# Patient Record
Sex: MALE | Race: OTHER
[De-identification: names, ages, dates, MRNs, and addresses within clinical notes are randomized per-mention and may not be internally consistent; named-entity substitution may affect disease eponyms.]

---

## 2018-11-13 ENCOUNTER — HOSPITAL ENCOUNTER (OUTPATIENT)
Dept: HOSPITAL 61 - ECHO | Age: 53
Discharge: HOME | End: 2018-11-13
Attending: INTERNAL MEDICINE
Payer: COMMERCIAL

## 2018-11-13 DIAGNOSIS — I08.2: Primary | ICD-10-CM

## 2018-11-13 PROCEDURE — 93306 TTE W/DOPPLER COMPLETE: CPT

## 2018-11-13 NOTE — CARD
MR#: [id number]

Account#: [id number]

Accession#: [id number]

Date of Study: 11/13/2018

Ordering Physician: MAGGIE MARSHALL, 

Referring Physician: MAGGIE MARSHALL, 

Tech: Shelia Bermudez [facility]





--------------- APPROVED REPORT --------------





EXAM: Two-dimensional and M-mode echocardiogram with Doppler and color Doppler.



Other Information 

Quality : GoodHR: 68bpm

Rhythm : NSR



INDICATION

Dyspnea 



2D DIMENSIONS 

RVDd2.9 (2.9-3.5cm)IVSd1.3 (0.7-1.1cm)

Aortic Root(2D)2.8 (2.0-3.7cm)LVDd4.7 (3.9-5.9cm)

PWd1.2 (0.7-1.1cm)LVDs3.1 (2.5-4.0cm)

FS (%) 35.3 %SV66.9 ml

LVEF(%)64.6 (>50%)



M-Mode DIMENSIONS 

Left Atrium(MM)3.35 (2.5-4.0cm)Aortic Root3.90 (2.2-3.7cm)



Aortic Valve

AoV Peak Riley.181.0cm/sAoV VTI34.7cm

AO Peak GR.13.1mmHgLVOT Peak Riley.132.7cm/s

AO Mean GR.7mmHgAVA   (VTI)2.20cm2

AI P 1/2 [id number]



Mitral Valve

MV E Btaaxtkj84.7cm/sMV E Peak Gr.2mmHg

MV DECEL [id number] A Nxdqwwws27.5cm/s

MV E Mean Gr.1mmHgE/A  Ratio0.9

MV A [id number]



Pulmonary Valve

PV Peak Cjcrqctu578.6cm/s



Tricuspid Valve

TR P. Cfvlbcsb565mq/sRAP NMAWMDYD7zaWv

TR Peak Gr.02uvDyJLGU21nzPi



 LEFT VENTRICLE 

The left ventricle is normal size. There is mild concentric left ventricular hypertrophy. The left ve
ntricular systolic function is normal and the ejection fraction is within normal range. The Ejection 
Fraction is 60-65%. There is normal LV segmental wall motion. Transmitral Doppler flow pattern is Gra
de I-abnormal relaxation pattern.



 RIGHT VENTRICLE 

The right ventricle is normal size. There is normal right ventricular wall thickness. The right ventr
icular systolic function is normal.



 ATRIA 

The left atrium size is normal. The right atrium size is normal. The interatrial septum is intact wit
h no evidence for an atrial septal defect or patent foramen ovale as noted on 2-D or Doppler imaging.




 AORTIC VALVE 

The aortic valve is normal in structure and function. The aortic valve is trileaflet. Doppler and Col
or Flow revealed trace to mild aortic regurgitation. There is no significant aortic valvular stenosis
.



 MITRAL VALVE 

The mitral valve is normal in structure and function. There is no evidence of mitral valve prolapse. 
There is no mitral valve stenosis. Doppler and Color Flow revealed no mitral valve regurgitation note
d.



 TRICUSPID VALVE 

The tricuspid valve is normal in structure and function. Doppler and Color Flow revealed mild tricusp
id regurgitation. The PA pressure was estimated at 28 mmHg. There is no tricuspid valve prolapse or v
egetation. There is no tricuspid valve stenosis.



 PULMONIC VALVE 

Doppler and Color Flow revealed no pulmonic valvular regurgitation. There is no pulmonic valvular ken
nosis.



 GREAT VESSELS 

The aortic root is mildly enlarged. The ascending aorta is borerline dilated at 3.7cm. The IVC is nor
mal in size and collapses >50% with inspiration.



 PERICARDIAL EFFUSION 

There is no evidence of significant pericardial effusion.



Critical Notification

Critical Value: No



<Conclusion>

The left ventricular systolic function is normal and the ejection fraction is within normal range. Th
e Ejection Fraction is 60-65%.

There is normal LV segmental wall motion.

The ascending aorta is borerline dilated at 3.7cm.



Signed by : Tommy Wan, 

Electronically Approved : 11/13/2018 10:41:12